# Patient Record
Sex: FEMALE | Race: WHITE | Employment: OTHER | URBAN - METROPOLITAN AREA
[De-identification: names, ages, dates, MRNs, and addresses within clinical notes are randomized per-mention and may not be internally consistent; named-entity substitution may affect disease eponyms.]

---

## 2018-03-07 PROBLEM — J01.00 ACUTE MAXILLARY SINUSITIS, RECURRENCE NOT SPECIFIED: Status: ACTIVE | Noted: 2018-03-07

## 2018-03-07 PROBLEM — I65.23 CAROTID OCCLUSION, BILATERAL: Status: ACTIVE | Noted: 2018-03-07

## 2018-03-07 PROBLEM — N39.0 URINARY TRACT INFECTION WITHOUT HEMATURIA, SITE UNSPECIFIED: Status: ACTIVE | Noted: 2018-03-07

## 2018-03-07 PROBLEM — R41.3 MEMORY LOSS: Status: ACTIVE | Noted: 2018-03-07

## 2018-03-07 PROBLEM — S09.90XS TRAUMATIC INJURY OF HEAD, SEQUELA: Status: ACTIVE | Noted: 2018-03-07

## 2018-03-22 PROCEDURE — 81001 URINALYSIS AUTO W/SCOPE: CPT | Performed by: INTERNAL MEDICINE

## 2018-03-22 PROCEDURE — 36415 COLL VENOUS BLD VENIPUNCTURE: CPT | Performed by: INTERNAL MEDICINE

## 2018-03-22 PROCEDURE — 87086 URINE CULTURE/COLONY COUNT: CPT | Performed by: INTERNAL MEDICINE

## 2018-03-22 PROCEDURE — 82607 VITAMIN B-12: CPT | Performed by: INTERNAL MEDICINE

## 2018-03-22 PROCEDURE — 83090 ASSAY OF HOMOCYSTEINE: CPT | Performed by: INTERNAL MEDICINE

## 2018-07-14 PROCEDURE — 82607 VITAMIN B-12: CPT | Performed by: INTERNAL MEDICINE

## 2018-07-14 PROCEDURE — 83921 ORGANIC ACID SINGLE QUANT: CPT | Performed by: INTERNAL MEDICINE

## 2018-11-02 PROBLEM — I65.21 CAROTID STENOSIS, RIGHT: Status: ACTIVE | Noted: 2018-11-02

## 2018-11-02 PROBLEM — D64.9 ANEMIA: Status: ACTIVE | Noted: 2018-11-02

## 2018-11-02 PROBLEM — L50.1 IDIOPATHIC URTICARIA: Status: ACTIVE | Noted: 2018-11-02

## 2018-11-02 PROBLEM — R79.89 ELEVATED LFTS: Status: ACTIVE | Noted: 2018-11-02

## 2018-11-02 PROBLEM — M85.80 OSTEOPENIA: Status: ACTIVE | Noted: 2018-11-02

## 2019-03-11 PROBLEM — E85.4 CEREBRAL AMYLOID ANGIOPATHY (HCC): Status: ACTIVE | Noted: 2019-03-11

## 2019-03-11 PROBLEM — F02.80 ALZHEIMER DISEASE (HCC): Status: ACTIVE | Noted: 2019-03-11

## 2019-03-11 PROBLEM — G30.9 ALZHEIMER DISEASE (HCC): Status: ACTIVE | Noted: 2019-03-11

## 2019-03-11 PROBLEM — I68.0 CEREBRAL AMYLOID ANGIOPATHY (HCC): Status: ACTIVE | Noted: 2019-03-11

## 2019-03-23 PROCEDURE — 87088 URINE BACTERIA CULTURE: CPT | Performed by: INTERNAL MEDICINE

## 2019-03-23 PROCEDURE — 83921 ORGANIC ACID SINGLE QUANT: CPT | Performed by: INTERNAL MEDICINE

## 2019-03-23 PROCEDURE — 87186 SC STD MICRODIL/AGAR DIL: CPT | Performed by: INTERNAL MEDICINE

## 2019-03-23 PROCEDURE — 87086 URINE CULTURE/COLONY COUNT: CPT | Performed by: INTERNAL MEDICINE

## 2019-03-23 PROCEDURE — 81001 URINALYSIS AUTO W/SCOPE: CPT | Performed by: INTERNAL MEDICINE

## 2019-04-06 PROCEDURE — 36415 COLL VENOUS BLD VENIPUNCTURE: CPT | Performed by: INTERNAL MEDICINE

## 2019-04-06 PROCEDURE — 83090 ASSAY OF HOMOCYSTEINE: CPT | Performed by: INTERNAL MEDICINE

## 2019-06-22 PROCEDURE — 81001 URINALYSIS AUTO W/SCOPE: CPT | Performed by: INTERNAL MEDICINE

## 2019-06-22 PROCEDURE — 87086 URINE CULTURE/COLONY COUNT: CPT | Performed by: INTERNAL MEDICINE

## 2020-03-23 PROCEDURE — 93010 ELECTROCARDIOGRAM REPORT: CPT | Performed by: INTERNAL MEDICINE

## 2020-12-21 PROBLEM — S22.080A COMPRESSION FRACTURE OF T12 VERTEBRA, INITIAL ENCOUNTER (HCC): Status: ACTIVE | Noted: 2020-12-18

## 2021-01-18 PROBLEM — M81.0 OSTEOPOROSIS: Status: ACTIVE | Noted: 2020-10-29

## 2021-05-24 ENCOUNTER — APPOINTMENT (OUTPATIENT)
Dept: GENERAL RADIOLOGY | Facility: HOSPITAL | Age: 81
End: 2021-05-24
Payer: MEDICARE

## 2021-05-24 ENCOUNTER — APPOINTMENT (OUTPATIENT)
Dept: ULTRASOUND IMAGING | Facility: HOSPITAL | Age: 81
End: 2021-05-24
Attending: EMERGENCY MEDICINE
Payer: MEDICARE

## 2021-05-24 ENCOUNTER — HOSPITAL ENCOUNTER (EMERGENCY)
Facility: HOSPITAL | Age: 81
Discharge: HOME | End: 2021-05-24
Attending: EMERGENCY MEDICINE
Payer: MEDICARE

## 2021-05-24 ENCOUNTER — APPOINTMENT (OUTPATIENT)
Dept: GENERAL RADIOLOGY | Facility: HOSPITAL | Age: 81
End: 2021-05-24
Attending: EMERGENCY MEDICINE
Payer: MEDICARE

## 2021-05-24 ENCOUNTER — HOSPITAL ENCOUNTER (OUTPATIENT)
Age: 81
Discharge: OTHER INSTITUTION | End: 2021-05-24
Attending: EMERGENCY MEDICINE
Payer: MEDICARE

## 2021-05-24 VITALS
HEART RATE: 90 BPM | OXYGEN SATURATION: 97 % | RESPIRATION RATE: 20 BRPM | TEMPERATURE: 97 F | SYSTOLIC BLOOD PRESSURE: 100 MMHG | DIASTOLIC BLOOD PRESSURE: 60 MMHG

## 2021-05-24 VITALS
DIASTOLIC BLOOD PRESSURE: 82 MMHG | SYSTOLIC BLOOD PRESSURE: 106 MMHG | HEART RATE: 92 BPM | RESPIRATION RATE: 20 BRPM | TEMPERATURE: 99 F | OXYGEN SATURATION: 96 %

## 2021-05-24 DIAGNOSIS — M25.561 ACUTE PAIN OF RIGHT KNEE: Primary | ICD-10-CM

## 2021-05-24 DIAGNOSIS — I95.9 HYPOTENSION, UNSPECIFIED HYPOTENSION TYPE: ICD-10-CM

## 2021-05-24 DIAGNOSIS — M71.21 BAKER'S CYST OF KNEE, RIGHT: ICD-10-CM

## 2021-05-24 DIAGNOSIS — M79.89 LEG SWELLING: ICD-10-CM

## 2021-05-24 DIAGNOSIS — M79.604 LEG PAIN, DIFFUSE, RIGHT: Primary | ICD-10-CM

## 2021-05-24 PROCEDURE — 73560 X-RAY EXAM OF KNEE 1 OR 2: CPT | Performed by: EMERGENCY MEDICINE

## 2021-05-24 PROCEDURE — 99202 OFFICE O/P NEW SF 15 MIN: CPT

## 2021-05-24 PROCEDURE — 73502 X-RAY EXAM HIP UNI 2-3 VIEWS: CPT | Performed by: EMERGENCY MEDICINE

## 2021-05-24 PROCEDURE — 99284 EMERGENCY DEPT VISIT MOD MDM: CPT

## 2021-05-24 PROCEDURE — 93971 EXTREMITY STUDY: CPT | Performed by: EMERGENCY MEDICINE

## 2021-05-24 NOTE — CM/SW NOTE
Spoke with granddaughter at bedside and discussed the option for home health care    Granddaughter unsure what her mom would decide    CM phone number given to granddaughter if they decide to set up [place] [phone]

## 2021-05-24 NOTE — ED INITIAL ASSESSMENT (HPI)
Sloan Viera is here for evaluation of right knee pain and swelling. She took a fall 1.5 weeks ago. Has been able to ambulate until the last few days. Now she is having difficulty bearing weight to right lower extremity.   She was evaluated at immediate care a

## 2021-05-24 NOTE — ED PROVIDER NOTES
Patient Seen in: [facility]      History   Patient presents with:  Leg or Foot Injury: Right knee pain - Entered by patient    Stated Complaint: Leg or Foot Injury - Right knee pain    HPI/Subjective:   HPI    80 yo female with h/o dementia. Appearance: Normal appearance. She is well-developed. Comments: Pleasantly demented   HENT:      Head: Normocephalic and atraumatic. Eyes:      Conjunctiva/sclera: Conjunctivae normal.      Pupils: Pupils are equal, round, and reactive to light.    C

## 2021-05-24 NOTE — ED INITIAL ASSESSMENT (HPI)
Presents in wheelchair with her daughter. Hx of Alzheimer's. Daughter states she falls frequently. Started c/o right knee/leg pain for 3 days. Unknown to family is she fell again. Patient cries and can't bear weight on RLE.  Daughter states patient cries wh

## 2021-05-25 NOTE — ED PROVIDER NOTES
Patient Seen in: [facility] AND [facility] Emergency Department    History   Patient presents with:  Fall  Leg or Foot Injury  Difficulty Walking      HPI    The patient presents to the ED complaining of right knee pain and swelling.   She fell 1 and half weeks a reviewed from today, pertinent positives to the presenting problem noted.     Physical Exam     ED Triage Vitals   BP 05/24/21 1240 134/68   Pulse 05/24/21 1240 106   Resp 05/24/21 1240 24   Temp 05/24/21 1240 98.7 °F (37.1 °C)   Temp src --    SpO2 05/24/2 acute fracture or subluxation. 2. Small knee joint effusion.     Dictated by (CST): Rudy Wiggins MD on 5/24/2021 at 2:22 PM     Finalized by (CST): Rudy Wiggins MD on 5/24/2021 at 2:23 PM          [address] [place] [address] ([facility]=[id number])    Re without obvious fracture. Ultrasound without evidence for DVT. Daughter would like the patient to come home and feels that she can care for her despite her inability to walk. Advised on outpatient follow-up.     Additional verbal instructions and return